# Patient Record
Sex: MALE | Race: WHITE
[De-identification: names, ages, dates, MRNs, and addresses within clinical notes are randomized per-mention and may not be internally consistent; named-entity substitution may affect disease eponyms.]

---

## 2019-04-04 NOTE — DIAGNOSTIC IMAGING REPORT
CLAUS BLISS 



[facility]



[address] [address] [facility] [address]



[place], [place]. [address]



 



 



 



 



Report Submission Date: 2019 2:06:46 PM CDT



Patient   Study 

Name: SUMMER DOMINGUEZ   Date: 2019 1:32:50 PM CDT 

MRN: [id number]   Modality Type: DX 

Gender: M   Description: CHEST 1VIEW 

: 60   Institution: [facility] 

Physician: CLAUS BLISS

    Accession:  [id number] 



 



 





Portable chest 



History:  Confusion 



Portable chest dated 2019 demonstrates a normal cardiomediastinal 
silhouette.  Pulmonary vascularity is normal.  Lungs are clear. 



Impression: 



No active disease.



 





Electronically signed on 2019 2:06:46 PM CDT by:



Samra NGO

## 2019-04-04 NOTE — ED PHYSICIAN DOCUMENTATION
General Adult





- HISTORIAN


Historian: patient





- HPI


Stated Complaint: anxiety, confusion, palpatations 


Chief Complaint: General Adult


Onset: other (not sure how long 3 days? )


Timing: still present


Severity: mild


Further Comments: yes (he states he has had a "lot of stress" and he just 

started to drive [place] and he feels lost and maybe confused. He states he is 

worried about where he will live. He denies any head injury.  He states he just 

cant remember what to do. Denies any bodily deficit.)





- ROS


CONST: no problems


EYES/ENT: none


CVS/RESP: none


GI/: none


MS/SKIN/LYMPH: none


NEURO/PSYCH: denies: headache, fainting, dizziness, tingling, numbness, 

difficulty walking, difficulty with speech





- PAST HX


Past History: AMI


Immunizations: UTD


Allergies/Adverse Reactions: 


                                    Allergies











Allergy/AdvReac Type Severity Reaction Status Date / Time


 


Penicillins Allergy   Verified 04/04/19 13:21














Home Medications: 


                                Ambulatory Orders











 Medication  Instructions  Recorded


 


Clonazepam [Klonopin] 1 mg PO TID 04/04/19


 


Dextroamphetamine/Amphetamine 30 mg PO DAILY 04/04/19





[Adderall 30 mg Tablet]  


 


Doxycycline [Vibramycin] 100 mg PO BID 04/04/19


 


Fluoxetine HCl [Prozac] 20 mg PO DAILY 04/04/19


 


QUEtiapine FUMARATE [Seroquel] 25 mg PO HS 04/04/19


 


Temazepam 30 mg PO HS 04/04/19














- SOCIAL HX


Smoking History: non-smoker


Alcohol Use: none


Drug Use: none





- FAMILY HX


Family History: No





- REVIEWED ASSESSMENTS


Nursing Assessment  Reviewed: Yes


Vitals Reviewed: Yes





Progress





- Progress


Progress: 





1405: sleeping quietly in room DG 


1445: discussed results and discharge plan - he is requesting to talk to the 

 DG


1455  at bedside  





ED Results Lab/Radiology





- Radiology


Radiology Impressions: 





Portable chest 





History: Confusion 





Portable chest dated April 4, 2019 demonstrates a normal cardiomediastinal 

silhouette. Pulmonary vascularity is normal. Lungs are clear. 





Impression: 





No active disease. 


Electronically signed on Apr 4, 2019 2:06:46 PM CDT by: 


Samra Schaefer











Examination: CT head without contrast 





History: CONFUSION SINCE YESTERDAY 





Comparison exam: None available 





Technique: Noncontrast head CT protocol. 





Findings: Ventricles and sulci are consistent for patient age. Cerebrocerebellar

 parenchyma demonstrates periventricular low attenuation consistent with small 

vessel disease. No evidence for parenchymal hemorrhage. No evidence for mass or 

mass effect. No midline shift. No extra axial fluid collections. Partial 

visualization of the paranasal sinuses, mastoid air cells, orbits, skull and 

scalp without gross irregularity. Streak artifact from dental hardware. 





Impression: Age related changes. No acute parenchymal process. No hemorrhage. 


Electronically signed on Apr 4, 2019 2:04:10 PM CDT by: 


Sunday Mariee





General Adult Physical Exam





- PHYSICAL EXAM


GENERAL APPEARANCE: mild distress


EENT: eye inspection normal, pharynx normal, no signs of dehydration, TM's nml


NECK: normal inspection


RESPIRATORY: no resp distress, chest non-tender, breath sounds normal


CVS: reg rate & rhythm, heart sounds normal, equal pulses, no murmur


ABDOMEN: soft, normal bowel sounds, no distension


RECTAL: normal exam


BACK: normal inspection, no CVA tenderness


SKIN: warm/dry, normal color


EXTREMITIES: non-tender


NEURO: oriented X3, motor nml, sensation nml, cognition normal, depressed 

mood/affect





Discharge


Clincal Impression: 


 Anxiety, Palpitation





Referrals: 


Primary Doctor,No [Primary Care Provider] - 2 Days


Comments: 





1. continue meds


2. Find PCP In area


3. Return to ER for any increasing concerns 


Condition: Stable


Disposition: 01 HOME, SELF-CARE


Decision to Admit: NO


Date of Decison to Admit: 04/04/19


Decision Time: 15:15

## 2019-04-04 NOTE — DIAGNOSTIC IMAGING REPORT
CLAUS BLISS 



[facility]



[address] [facility] [address]



[place], [place]. [address]



 



 



 



 



Report Submission Date: 2019 2:04:10 PM CDT



Patient   Study 

Name: SUMMER DOMINGUEZ   Date: 2019 1:27:12 PM CDT 

MRN: [id number]   Modality Type: CT\SR 

Gender: M   Description: CT BRAIN W/O CONTRAST 

: 60   Institution: [facility] 

Physician: CLAUS BLISS

    Accession:  [id number] 



 



 





Examination: CT head without contrast 



History: CONFUSION SINCE YESTERDAY 



Comparison exam: None available 



Technique: Noncontrast head CT protocol. 



Findings: Ventricles and sulci are consistent for patient age. Cerebrocerebellar
parenchyma demonstrates periventricular low attenuation consistent with small 
vessel disease. No evidence for parenchymal hemorrhage. No evidence for mass or 
mass effect. No midline shift. No extra axial fluid collections. Partial 
visualization of the paranasal sinuses, mastoid air cells, orbits, skull and 
scalp without gross irregularity. Streak artifact from dental hardware. 



Impression: Age related changes. No acute parenchymal process. No hemorrhage.



 





Electronically signed on 2019 2:04:10 PM CDT by:



Sunday NGO

## 2019-04-14 VITALS — DIASTOLIC BLOOD PRESSURE: 87 MMHG | SYSTOLIC BLOOD PRESSURE: 148 MMHG

## 2019-04-14 NOTE — ED PHYSICIAN DOCUMENTATION
General Adult





- HISTORIAN


Historian: patient





- HPI


Stated Complaint: Accidental ingestion


Chief Complaint: General Adult


Additional Information: 





Patient states that he accidentally took some  ([employer] multipurpose 

) in his mouth thinking it was some fruit juice and spit it out. He 

believes that he may have swallowed a little of it. He is now having some mild 

nausea and burning sensation in the back of his throat. Patient is afraid that 

he might have some detrimental problems associated with it. 


Patient has seen Dr Magana on Friday and needs some of his psychiatric 

medications filled. He will run out of his Adderall tomorrow. 


Onset: minutes


Timing: still present


Severity: mild





- ROS


CONST: denies: fever


CVS/RESP: none


GI/: vomiting (dry heaving), nausea


NEURO/PSYCH: anxiety





- PAST HX


Past History: hypertension, other (CAD, ADHD, depression)


Other History: none


Surgeries/Procedures: other (Cardiac stints (5), appendectomy, carpal tunnel 

release)


Immunizations: referred to PCP


Allergies/Adverse Reactions: 


                                    Allergies











Allergy/AdvReac Type Severity Reaction Status Date / Time


 


Penicillins Allergy   Verified 04/14/19 19:57














Home Medications: 


                                Ambulatory Orders











 Medication  Instructions  Recorded


 


Fluoxetine HCl [Prozac] 20 mg PO DAILY 04/04/19


 


Temazepam 30 mg PO HS 04/04/19














- SOCIAL HX


Smoking History: non-smoker


Alcohol Use: none


Drug Use: none, other (use to do crack coccaine)





- FAMILY HX


Family History: No





- VITAL SIGNS


Vital Signs: 





                                   Vital Signs











Temp Pulse Resp BP Pulse Ox


 


 98.4 F   98 H  19   177/105   96 


 


 04/14/19 19:49  04/14/19 19:49  04/14/19 19:49  04/14/19 19:49  04/14/19 19:49














General Adult Physical Exam





- PHYSICAL EXAM


GENERAL APPEARANCE: moderate distress


EENT: pharynx normal (no irritation or redness noted), no signs of dehydration


NECK: normal inspection, thyroid normal, supple


RESPIRATORY: no resp distress, chest non-tender, breath sounds normal.  No: 

wheezes, rales


CVS: reg rate & rhythm, heart sounds normal, equal pulses, no murmur, no gallop


ABDOMEN: soft, no organomegaly, normal bowel sounds, no abdominal bruit, no 

distension, non-tender


NEURO: oriented X3, CN's nml as tested, other (anxious)





Discharge


Clincal Impression: 


Accidental ingestion of substance


Qualifiers:


 Encounter type: initial encounter Qualified Code(s): T65.91XA - Toxic effect of

unspecified substance, accidental (unintentional), initial encounter





Referrals: 


Primary Doctor,Alyce [Primary Care Provider] - 04/22/19


Additional Instructions: 


Continue to drink a lot of fluids (water) over the next 24hours. If you 

developed any problems to return tot he clinic in the AM or return to the ED. I 

will consult Dr. Magana about your psychiatric medications and where he is on

filling those for you. 


Condition: Stable


Disposition: 01 HOME, SELF-CARE


Decision to Admit: NO


Date of Decison to Admit: 04/14/19


Decision Time: 20:00

## 2019-10-23 ENCOUNTER — HOSPITAL ENCOUNTER (OUTPATIENT)
Dept: HOSPITAL 44 - LAB | Age: 59
End: 2019-10-23
Attending: FAMILY MEDICINE
Payer: COMMERCIAL

## 2019-10-23 DIAGNOSIS — R73.09: Primary | ICD-10-CM

## 2019-10-23 PROCEDURE — 36415 COLL VENOUS BLD VENIPUNCTURE: CPT

## 2019-10-23 PROCEDURE — 83036 HEMOGLOBIN GLYCOSYLATED A1C: CPT
